# Patient Record
Sex: MALE | Race: WHITE
[De-identification: names, ages, dates, MRNs, and addresses within clinical notes are randomized per-mention and may not be internally consistent; named-entity substitution may affect disease eponyms.]

---

## 2023-05-09 ENCOUNTER — APPOINTMENT (OUTPATIENT)
Dept: ORTHOPEDIC SURGERY | Facility: CLINIC | Age: 78
End: 2023-05-09
Payer: MEDICARE

## 2023-05-09 VITALS — WEIGHT: 220 LBS | BODY MASS INDEX: 29.8 KG/M2 | HEIGHT: 72 IN

## 2023-05-09 DIAGNOSIS — M75.111 INCOMPLETE ROTATOR CUFF TEAR OR RUPTURE OF RIGHT SHOULDER, NOT SPECIFIED AS TRAUMATIC: ICD-10-CM

## 2023-05-09 DIAGNOSIS — Z78.9 OTHER SPECIFIED HEALTH STATUS: ICD-10-CM

## 2023-05-09 DIAGNOSIS — Z86.39 PERSONAL HISTORY OF OTHER ENDOCRINE, NUTRITIONAL AND METABOLIC DISEASE: ICD-10-CM

## 2023-05-09 PROCEDURE — 73010 X-RAY EXAM OF SHOULDER BLADE: CPT | Mod: RT

## 2023-05-09 PROCEDURE — 99203 OFFICE O/P NEW LOW 30 MIN: CPT

## 2023-05-09 PROCEDURE — 73030 X-RAY EXAM OF SHOULDER: CPT | Mod: RT

## 2023-05-09 NOTE — IMAGING
[Right] : right shoulder [Degenerative change] : Degenerative change [FreeTextEntry1] : humeral head elevation

## 2023-05-09 NOTE — ASSESSMENT
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

## 2023-05-09 NOTE — HISTORY OF PRESENT ILLNESS
[5] : 5 [Dull/Aching] : dull/aching [Tightness] : tightness [Retired] : Work status: retired [de-identified] : 5/9/23:  77 yo m presents with R shoulder pain since 2019. He had a R shoulder RCT treated conservatively and progressed well. Recently he has had sharp lateral shoulder pain with overhead movements. \par \par  6/18/19: Here for follow up. He has been doing well until having more pain recently. He plays golf without pain but he hasn’t thrown or played tennis. He is active in the gym.\par \par 12/12/17: Here for follow up. He has had more pain in the shoulder recently.\par \par 11/28/17: Here for follow up. He is doing well. He is doing an HEP and is at the gym. He notices mild pain and some weakness only in certain positions.\par \par 10/3/17: Here for follow up. He had a return of his pain a few weeks ago, but has since improved. He wants to try tennis again.\par \par 8/1/17: Here for follow up. He has been in PT and did respond well to the injection. He has returned to most activities. Some pain.\par 6/23/17: F/u right shoulder. He is here to review MRI.\par \par MRI RIGHT SHOULDER:\par 1. Tearing of the superior labrum extending anteriorly to involve the anterior inferior labrum. There is subcortical cystic change in the posterior superior glenoid.\par 2. There is superior subluxation of the humeral head with reduction of the subacromial space\par 3. The supraspinatus tendon is torn and retracted approximately 3.8 cm to the level of the glenohumeral joint. There is atrophy of the supraspinatus muscle the infraspinatus is also torn and retracted similarly with muscle atrophy\par 4. Partial interstitial tearing of the subscapularis\par 5. There is partial tearing of the proximal biceps tendon with medial subluxation as it enters the biceps groove. More distally, the tendon is intact with tenosynovitis\par \par 6/20/17: 71 y/o RHD male here for the R shoulder. He first noticed pain in the shoulder one year ago. He saw a chiropractor at that time. Pain is located anteriorly. He was playing golf two days ago and noticed another sharp pain in thee shoulder. No recent treatment.\par  [FreeTextEntry1] : R shoulder [de-identified] : diclofenac

## 2023-05-09 NOTE — PHYSICAL EXAM
[5 ___] : forward flexion 5[unfilled]/5 [5___] : external rotation 5[unfilled]/5 [Right] : right shoulder [] : no erythema [FreeTextEntry9] : FE: 160\par ER: 40- [TWNoteComboBox4] : False

## 2023-08-11 ENCOUNTER — APPOINTMENT (OUTPATIENT)
Dept: ORTHOPEDIC SURGERY | Facility: CLINIC | Age: 78
End: 2023-08-11
Payer: MEDICARE

## 2023-08-11 VITALS — HEIGHT: 72 IN | BODY MASS INDEX: 29.8 KG/M2 | WEIGHT: 220 LBS

## 2023-08-11 DIAGNOSIS — Z00.00 ENCOUNTER FOR GENERAL ADULT MEDICAL EXAMINATION W/OUT ABNORMAL FINDINGS: ICD-10-CM

## 2023-08-11 DIAGNOSIS — S83.412A SPRAIN OF MEDIAL COLLATERAL LIGAMENT OF LEFT KNEE, INITIAL ENCOUNTER: ICD-10-CM

## 2023-08-11 PROCEDURE — 99213 OFFICE O/P EST LOW 20 MIN: CPT

## 2023-08-11 PROCEDURE — 73564 X-RAY EXAM KNEE 4 OR MORE: CPT | Mod: LT

## 2023-08-11 NOTE — IMAGING
[Left] : left knee [All Views] : anteroposterior, lateral, skyline, and anteroposterior standing [There are no fractures, subluxations or dislocations. No significant abnormalities are seen] : There are no fractures, subluxations or dislocations. No significant abnormalities are seen [de-identified] : left knee MIld effusion, no warmth, no ecchymosis, no erythema. proximal mcl ttp Range of motion 0-130 without pain 5/5 quadriceps and hamstring strength Negative Lachman, negative Clyde, negative anterior drawer, negative posterior drawer, negative patella apprehension; no extensor lag: no varus or valgus instability. Motor and sensory intact distally Negative Niraj Non-antalgic gait [FreeTextEntry9] : mcl calcium

## 2023-08-11 NOTE — HISTORY OF PRESENT ILLNESS
[Sudden] : sudden [9] : 9 [8] : 8 [Dull/Aching] : dull/aching [Ice] : ice [] : no [FreeTextEntry1] : L knee [FreeTextEntry3] : last week [FreeTextEntry5] : twisted knee on tennis court

## 2023-08-11 NOTE — ASSESSMENT
[FreeTextEntry1] : TRAUMATIC MEDIAL LEFT KNEE PAIN AFTER TWISTING INJURY PLAYING TENNIS 1 WEEK AGO XRAYS WITH MCL CALCIFICATION  NO MECHANICAL SYMPTOMS BUT MILD/MOD EFFUSION TODAY SYMPTOMS IMPROVED 50-75% OVER PAST 2 DAYS PT DISCUSSED, PATIENT DECLINED, HE WILL DO HEP ACTIVITY AS TOLERATED CONSIDER ASP/INJECTION IF PERSISTS

## 2023-10-17 ENCOUNTER — APPOINTMENT (OUTPATIENT)
Dept: ORTHOPEDIC SURGERY | Facility: CLINIC | Age: 78
End: 2023-10-17
Payer: MEDICARE

## 2023-10-17 PROCEDURE — 99213 OFFICE O/P EST LOW 20 MIN: CPT | Mod: 25

## 2023-10-17 PROCEDURE — 20611 DRAIN/INJ JOINT/BURSA W/US: CPT | Mod: RT

## 2023-10-17 PROCEDURE — J3490M: CUSTOM | Mod: NC

## 2024-02-13 ENCOUNTER — APPOINTMENT (OUTPATIENT)
Dept: ORTHOPEDIC SURGERY | Facility: CLINIC | Age: 79
End: 2024-02-13

## 2024-04-09 ENCOUNTER — APPOINTMENT (OUTPATIENT)
Dept: ORTHOPEDIC SURGERY | Facility: CLINIC | Age: 79
End: 2024-04-09

## 2024-04-09 VITALS — BODY MASS INDEX: 29.8 KG/M2 | HEIGHT: 72 IN | WEIGHT: 220 LBS

## 2024-04-23 ENCOUNTER — APPOINTMENT (OUTPATIENT)
Dept: ORTHOPEDIC SURGERY | Facility: CLINIC | Age: 79
End: 2024-04-23
Payer: MEDICARE

## 2024-04-23 VITALS — HEIGHT: 72 IN | BODY MASS INDEX: 29.8 KG/M2 | WEIGHT: 220 LBS

## 2024-04-23 DIAGNOSIS — M75.121 COMPLETE ROTATOR CUFF TEAR OR RUPTURE OF RIGHT SHOULDER, NOT SPECIFIED AS TRAUMATIC: ICD-10-CM

## 2024-04-23 PROCEDURE — 99213 OFFICE O/P EST LOW 20 MIN: CPT | Mod: 25

## 2024-04-23 PROCEDURE — J3490M: CUSTOM | Mod: NC

## 2024-04-23 PROCEDURE — 20611 DRAIN/INJ JOINT/BURSA W/US: CPT | Mod: RT

## 2024-04-23 NOTE — HISTORY OF PRESENT ILLNESS
[de-identified] : 4/23/24: Here for follow up. He states injection helped for a bout 4 months. Pain has returned.  10/17/23:  Here for follow up.     5/9/23:  77 yo m presents with R shoulder pain since 2019. He had a R shoulder RCT treated conservatively and progressed well. Recently he has had sharp lateral shoulder pain with overhead movements.    6/18/19: Here for follow up. He has been doing well until having more pain recently. He plays golf without pain but he hasn't thrown or played tennis. He is active in the gym.  12/12/17: Here for follow up. He has had more pain in the shoulder recently.  11/28/17: Here for follow up. He is doing well. He is doing an HEP and is at the gym. He notices mild pain and some weakness only in certain positions.  10/3/17: Here for follow up. He had a return of his pain a few weeks ago, but has since improved. He wants to try tennis again.  8/1/17: Here for follow up. He has been in PT and did respond well to the injection. He has returned to most activities. Some pain. 6/23/17: F/u right shoulder. He is here to review MRI.  MRI RIGHT SHOULDER: 1. Tearing of the superior labrum extending anteriorly to involve the anterior inferior labrum. There is subcortical cystic change in the posterior superior glenoid. 2. There is superior subluxation of the humeral head with reduction of the subacromial space 3. The supraspinatus tendon is torn and retracted approximately 3.8 cm to the level of the glenohumeral joint. There is atrophy of the supraspinatus muscle the infraspinatus is also torn and retracted similarly with muscle atrophy 4. Partial interstitial tearing of the subscapularis 5. There is partial tearing of the proximal biceps tendon with medial subluxation as it enters the biceps groove. More distally, the tendon is intact with tenosynovitis  6/20/17: 71 y/o RHD male here for the R shoulder. He first noticed pain in the shoulder one year ago. He saw a chiropractor at that time. Pain is located anteriorly. He was playing golf two days ago and noticed another sharp pain in thee shoulder. No recent treatment.  [FreeTextEntry5] : Here to follow up on right shoulder. CSI () from 10/17/23 gave relief for 4 months. No pain with activity, only while at rest. Inquiring about repeat injection.

## 2024-04-23 NOTE — PHYSICAL EXAM
[Right] : right shoulder [5 ___] : forward flexion 5[unfilled]/5 [5___] : external rotation 5[unfilled]/5 [] : no erythema [FreeTextEntry9] : FE: 160\par  ER: 40-

## 2024-04-23 NOTE — ASSESSMENT
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 Right now he is too good for surgery. Continue HEP, activities as tolerated. NSAIDs prn. Repeat R SA injection given. RTO prn.  Procedure Note: Large Joint Injection was performed because of pain and inflammation, failure of conservative treatment.   Medications: Depo-Medrol: 1 cc, 80 mg. Lidocaine: 2 cc, 1%.  Marcaine: 2 cc, .25%.   Medication was injected in the right subacromial space. Patient has tried OTC's including aspirin, Ibuprofen, Aleve etc or prescription NSAIDs, and/or exercises at home and/ or physical therapy without satisfactory response. The risks, benefits, and alternatives to cortisone injection were explained in full to the patient. Risks outlined include but are not limited to infection, sepsis, bleeding, scarring, skin discoloration, temporary increase in pain, syncopal episode, failure to resolve symptoms, allergic reaction, symptom recurrence, and elevation of blood sugar in diabetics. Patient understood the risks. All questions were answered. After discussion of options, patient requested an injection. Oral informed consent was obtained and sterile prep of the injection site was performed using alcohol. Sterile technique was utilized for the procedure including the preparation of the solutions used for the injection. Ethyl chloride spray was used topically.  Sterile technique used. Patient tolerated procedure well. Post Procedure Instructions: Patient was advised to call if redness, pain, or fever occur and apply ice for 15 min. out of every hour for the next 12-24 hours as tolerated. patient was advised to rest the joint(s) for 2 days.  Ultrasound Guidance was used for the following reasons: for prior failure or difficult injection and to visualize tearing and inflammation. Ultrasound guided injection was performed of the shoulder, visualization of the needle and placement of injection was performed without complication.

## 2024-12-05 ENCOUNTER — APPOINTMENT (OUTPATIENT)
Dept: ORTHOPEDIC SURGERY | Facility: CLINIC | Age: 79
End: 2024-12-05

## 2024-12-05 VITALS — HEIGHT: 72 IN | WEIGHT: 220 LBS | BODY MASS INDEX: 29.8 KG/M2

## 2024-12-05 DIAGNOSIS — M75.121 COMPLETE ROTATOR CUFF TEAR OR RUPTURE OF RIGHT SHOULDER, NOT SPECIFIED AS TRAUMATIC: ICD-10-CM

## 2024-12-05 DIAGNOSIS — M75.111 INCOMPLETE ROTATOR CUFF TEAR OR RUPTURE OF RIGHT SHOULDER, NOT SPECIFIED AS TRAUMATIC: ICD-10-CM

## 2024-12-05 PROCEDURE — 20611 DRAIN/INJ JOINT/BURSA W/US: CPT | Mod: RT

## 2024-12-05 PROCEDURE — 99213 OFFICE O/P EST LOW 20 MIN: CPT | Mod: 25

## 2024-12-05 PROCEDURE — J3490M: CUSTOM | Mod: NC

## 2025-04-01 ENCOUNTER — APPOINTMENT (OUTPATIENT)
Dept: ORTHOPEDIC SURGERY | Facility: CLINIC | Age: 80
End: 2025-04-01
Payer: MEDICARE

## 2025-04-01 DIAGNOSIS — M75.121 COMPLETE ROTATOR CUFF TEAR OR RUPTURE OF RIGHT SHOULDER, NOT SPECIFIED AS TRAUMATIC: ICD-10-CM

## 2025-04-01 PROCEDURE — 20611 DRAIN/INJ JOINT/BURSA W/US: CPT | Mod: RT

## 2025-04-01 PROCEDURE — 99213 OFFICE O/P EST LOW 20 MIN: CPT | Mod: 25

## 2025-04-01 RX ORDER — LISINOPRIL 30 MG/1
TABLET ORAL
Refills: 0 | Status: ACTIVE | COMMUNITY

## 2025-04-01 RX ORDER — METFORMIN HYDROCHLORIDE 750 MG/1
TABLET ORAL
Refills: 0 | Status: ACTIVE | COMMUNITY

## 2025-04-01 RX ORDER — ATORVASTATIN CALCIUM 80 MG/1
TABLET, FILM COATED ORAL
Refills: 0 | Status: ACTIVE | COMMUNITY

## 2025-04-29 ENCOUNTER — APPOINTMENT (OUTPATIENT)
Dept: ORTHOPEDIC SURGERY | Facility: CLINIC | Age: 80
End: 2025-04-29
Payer: MEDICARE

## 2025-04-29 VITALS — WEIGHT: 220 LBS | BODY MASS INDEX: 29.8 KG/M2 | HEIGHT: 72 IN

## 2025-04-29 DIAGNOSIS — M75.121 COMPLETE ROTATOR CUFF TEAR OR RUPTURE OF RIGHT SHOULDER, NOT SPECIFIED AS TRAUMATIC: ICD-10-CM

## 2025-04-29 PROCEDURE — 99213 OFFICE O/P EST LOW 20 MIN: CPT
